# Patient Record
Sex: FEMALE
[De-identification: names, ages, dates, MRNs, and addresses within clinical notes are randomized per-mention and may not be internally consistent; named-entity substitution may affect disease eponyms.]

---

## 2019-01-22 ENCOUNTER — HOSPITAL ENCOUNTER (OUTPATIENT)
Dept: HOSPITAL 90 - LDH | Age: 44
Setting detail: OBSERVATION
Discharge: HOME | End: 2019-01-22
Attending: OBSTETRICS & GYNECOLOGY | Admitting: OBSTETRICS & GYNECOLOGY
Payer: COMMERCIAL

## 2019-01-22 DIAGNOSIS — Z3A.34: ICD-10-CM

## 2019-01-22 DIAGNOSIS — O13.3: Primary | ICD-10-CM

## 2019-01-22 DIAGNOSIS — O09.523: ICD-10-CM

## 2019-01-22 LAB
ALBUMIN SERPL-MCNC: 2.1 G/DL (ref 3.5–5)
ALT SERPL-CCNC: 36 U/L (ref 12–78)
APPEARANCE UR: CLEAR
APTT PPP: 25.2 SEC (ref 26.3–35.5)
AST SERPL-CCNC: 26 U/L (ref 10–37)
BASOPHILS NFR BLD AUTO: 0.3 % (ref 0–5)
BILIRUB SERPL-MCNC: 0.2 MG/DL (ref 0.2–1)
BUN SERPL-MCNC: 11 MG/DL (ref 7–18)
CHLORIDE SERPL-SCNC: 107 MMOL/L (ref 101–111)
CO2 SERPL-SCNC: 25 MMOL/L (ref 21–32)
CREAT SERPL-MCNC: 0.7 MG/DL (ref 0.5–1.5)
EOSINOPHIL NFR BLD AUTO: 0.7 % (ref 0–8)
ERYTHROCYTE [DISTWIDTH] IN BLOOD BY AUTOMATED COUNT: 15.6 % (ref 11–15.5)
FIBRINOGEN PPP-MCNC: 382 MG/DL (ref 180–350)
GFR SERPL CREATININE-BSD FRML MDRD: 98 ML/MIN (ref 60–?)
GLUCOSE SERPL-MCNC: 92 MG/DL (ref 70–105)
HCT VFR BLD AUTO: 31.9 % (ref 36–48)
INR PPP: 0.89 (ref 0.85–1.15)
LYMPHOCYTES NFR SPEC AUTO: 16 % (ref 21–51)
MCH RBC QN AUTO: 27.2 PG (ref 27–33)
MCHC RBC AUTO-ENTMCNC: 32 G/DL (ref 32–36)
MCV RBC AUTO: 85.1 FL (ref 79–99)
MONOCYTES NFR BLD AUTO: 8.7 % (ref 3–13)
NEUTROPHILS NFR BLD AUTO: 74.3 % (ref 40–77)
NRBC BLD MANUAL-RTO: 0.1 % (ref 0–0.19)
PH UR STRIP: 8 [PH] (ref 5–8)
PLATELET # BLD AUTO: 114 K/UL (ref 130–400)
POTASSIUM SERPL-SCNC: 3.8 MMOL/L (ref 3.5–5.1)
PROT SERPL-MCNC: 5.8 G/DL (ref 6–8.3)
PROTHROMBIN TIME: 9.4 SEC (ref 9.6–11.6)
RBC # BLD AUTO: 3.75 MIL/UL (ref 4–5.5)
SODIUM SERPL-SCNC: 139 MMOL/L (ref 136–145)
SP GR UR STRIP: 1.01 (ref 1–1.03)
URATE SERPL-MCNC: 4.4 MG/DL (ref 2.6–7.2)
UROBILINOGEN UR STRIP-MCNC: 0.2 MG/DL (ref 0.2–1)
WBC # BLD AUTO: 7.8 K/UL (ref 4.8–10.8)

## 2019-01-22 PROCEDURE — 80053 COMPREHEN METABOLIC PANEL: CPT

## 2019-01-22 PROCEDURE — 85610 PROTHROMBIN TIME: CPT

## 2019-01-22 PROCEDURE — 86900 BLOOD TYPING SEROLOGIC ABO: CPT

## 2019-01-22 PROCEDURE — 85384 FIBRINOGEN ACTIVITY: CPT

## 2019-01-22 PROCEDURE — 96360 HYDRATION IV INFUSION INIT: CPT

## 2019-01-22 PROCEDURE — 36415 COLL VENOUS BLD VENIPUNCTURE: CPT

## 2019-01-22 PROCEDURE — 87340 HEPATITIS B SURFACE AG IA: CPT

## 2019-01-22 PROCEDURE — 85025 COMPLETE CBC W/AUTO DIFF WBC: CPT

## 2019-01-22 PROCEDURE — 86850 RBC ANTIBODY SCREEN: CPT

## 2019-01-22 PROCEDURE — 86592 SYPHILIS TEST NON-TREP QUAL: CPT

## 2019-01-22 PROCEDURE — 84550 ASSAY OF BLOOD/URIC ACID: CPT

## 2019-01-22 PROCEDURE — 85730 THROMBOPLASTIN TIME PARTIAL: CPT

## 2019-01-22 PROCEDURE — 86901 BLOOD TYPING SEROLOGIC RH(D): CPT

## 2019-01-22 PROCEDURE — 81003 URINALYSIS AUTO W/O SCOPE: CPT

## 2019-02-07 ENCOUNTER — HOSPITAL ENCOUNTER (INPATIENT)
Dept: HOSPITAL 90 - LDH | Age: 44
LOS: 3 days | Discharge: HOME | End: 2019-02-10
Attending: OBSTETRICS & GYNECOLOGY | Admitting: OBSTETRICS & GYNECOLOGY
Payer: COMMERCIAL

## 2019-02-07 VITALS — HEIGHT: 62 IN | BODY MASS INDEX: 42.88 KG/M2 | WEIGHT: 233 LBS

## 2019-02-07 DIAGNOSIS — O26.893: ICD-10-CM

## 2019-02-07 DIAGNOSIS — Z3A.37: ICD-10-CM

## 2019-02-07 DIAGNOSIS — Z67.41: ICD-10-CM

## 2019-02-07 DIAGNOSIS — O34.211: Primary | ICD-10-CM

## 2019-02-07 DIAGNOSIS — Z30.2: ICD-10-CM

## 2019-02-07 LAB
ERYTHROCYTE [DISTWIDTH] IN BLOOD BY AUTOMATED COUNT: 15.8 % (ref 11–15.5)
HCT VFR BLD AUTO: 33.5 % (ref 36–48)
MCH RBC QN AUTO: 28 PG (ref 27–33)
MCHC RBC AUTO-ENTMCNC: 33.3 G/DL (ref 32–36)
MCV RBC AUTO: 83.9 FL (ref 79–99)
NRBC BLD MANUAL-RTO: 0 % (ref 0–0.19)
PLATELET # BLD AUTO: 99 K/UL (ref 130–400)
RBC # BLD AUTO: 4 MIL/UL (ref 4–5.5)
WBC # BLD AUTO: 5.6 K/UL (ref 4.8–10.8)

## 2019-02-07 PROCEDURE — 86850 RBC ANTIBODY SCREEN: CPT

## 2019-02-07 PROCEDURE — 36415 COLL VENOUS BLD VENIPUNCTURE: CPT

## 2019-02-07 PROCEDURE — 85027 COMPLETE CBC AUTOMATED: CPT

## 2019-02-07 PROCEDURE — 86592 SYPHILIS TEST NON-TREP QUAL: CPT

## 2019-02-07 PROCEDURE — 86900 BLOOD TYPING SEROLOGIC ABO: CPT

## 2019-02-07 PROCEDURE — 59510 CESAREAN DELIVERY: CPT

## 2019-02-07 PROCEDURE — 86901 BLOOD TYPING SEROLOGIC RH(D): CPT

## 2019-02-07 PROCEDURE — 88302 TISSUE EXAM BY PATHOLOGIST: CPT

## 2019-02-07 PROCEDURE — 87340 HEPATITIS B SURFACE AG IA: CPT

## 2019-02-08 VITALS — SYSTOLIC BLOOD PRESSURE: 126 MMHG | DIASTOLIC BLOOD PRESSURE: 56 MMHG

## 2019-02-08 VITALS — DIASTOLIC BLOOD PRESSURE: 81 MMHG | SYSTOLIC BLOOD PRESSURE: 136 MMHG

## 2019-02-08 VITALS — SYSTOLIC BLOOD PRESSURE: 149 MMHG | DIASTOLIC BLOOD PRESSURE: 70 MMHG

## 2019-02-08 VITALS — SYSTOLIC BLOOD PRESSURE: 138 MMHG | DIASTOLIC BLOOD PRESSURE: 72 MMHG

## 2019-02-08 PROCEDURE — 3E0234Z INTRODUCTION OF SERUM, TOXOID AND VACCINE INTO MUSCLE, PERCUTANEOUS APPROACH: ICD-10-PCS | Performed by: OBSTETRICS & GYNECOLOGY

## 2019-02-08 PROCEDURE — 0UB70ZZ EXCISION OF BILATERAL FALLOPIAN TUBES, OPEN APPROACH: ICD-10-PCS | Performed by: OBSTETRICS & GYNECOLOGY

## 2019-02-08 RX ADMIN — SODIUM CHLORIDE PRN MG: 9 INJECTION, SOLUTION INTRAVENOUS at 12:51

## 2019-02-08 RX ADMIN — IBUPROFEN SCH MLS/HR: 800 INJECTION INTRAVENOUS at 18:33

## 2019-02-08 RX ADMIN — SODIUM CHLORIDE PRN MG: 9 INJECTION, SOLUTION INTRAVENOUS at 23:26

## 2019-02-08 NOTE — NUR
ACTIVITY

MARIA DEL CARMEN CARE GIVEN, ENCOURAGED TO TURN TO SIDES OFTEN, TOLERATED WELL, TO SIDE OF BED, DANGLED, 
BECAME DIZZY, BUT STILL WANTED TO DANGLE, , TOLERATED WELL, SPOUSE AT BEDSIDE

-------------------------------------------------------------------------------

Addendum: 02/09/19 at 0213 by MELINA SOLANO LVN

-------------------------------------------------------------------------------

Amended: Links added.

## 2019-02-09 VITALS — DIASTOLIC BLOOD PRESSURE: 80 MMHG | SYSTOLIC BLOOD PRESSURE: 132 MMHG

## 2019-02-09 VITALS — DIASTOLIC BLOOD PRESSURE: 84 MMHG | SYSTOLIC BLOOD PRESSURE: 134 MMHG

## 2019-02-09 VITALS — SYSTOLIC BLOOD PRESSURE: 145 MMHG | DIASTOLIC BLOOD PRESSURE: 90 MMHG

## 2019-02-09 VITALS — SYSTOLIC BLOOD PRESSURE: 102 MMHG | DIASTOLIC BLOOD PRESSURE: 80 MMHG

## 2019-02-09 VITALS — DIASTOLIC BLOOD PRESSURE: 53 MMHG | SYSTOLIC BLOOD PRESSURE: 123 MMHG

## 2019-02-09 VITALS — SYSTOLIC BLOOD PRESSURE: 135 MMHG | DIASTOLIC BLOOD PRESSURE: 81 MMHG

## 2019-02-09 LAB
ERYTHROCYTE [DISTWIDTH] IN BLOOD BY AUTOMATED COUNT: 16.1 % (ref 11–15.5)
HCT VFR BLD AUTO: 31.1 % (ref 36–48)
MCH RBC QN AUTO: 28.2 PG (ref 27–33)
MCHC RBC AUTO-ENTMCNC: 33.8 G/DL (ref 32–36)
MCV RBC AUTO: 83.4 FL (ref 79–99)
NRBC BLD MANUAL-RTO: 0 % (ref 0–0.19)
PLATELET # BLD AUTO: 98 K/UL (ref 130–400)
RBC # BLD AUTO: 3.72 MIL/UL (ref 4–5.5)
WBC # BLD AUTO: 6.2 K/UL (ref 4.8–10.8)

## 2019-02-09 RX ADMIN — IBUPROFEN SCH MLS/HR: 800 INJECTION INTRAVENOUS at 02:26

## 2019-02-09 RX ADMIN — ACETAMINOPHEN AND CODEINE PHOSPHATE PRN TAB: 300; 30 TABLET ORAL at 22:18

## 2019-02-09 RX ADMIN — ACETAMINOPHEN AND CODEINE PHOSPHATE PRN TAB: 300; 30 TABLET ORAL at 14:22

## 2019-02-09 RX ADMIN — Medication PRN MG: at 14:22

## 2019-02-09 RX ADMIN — Medication PRN MG: at 19:29

## 2019-02-09 RX ADMIN — Medication PRN MG: at 20:33

## 2019-02-09 RX ADMIN — BISACODYL PRN MG: 10 SUPPOSITORY RECTAL at 17:33

## 2019-02-09 RX ADMIN — IBUPROFEN SCH MG: 800 TABLET ORAL at 11:31

## 2019-02-09 RX ADMIN — BISACODYL PRN MG: 10 SUPPOSITORY RECTAL at 22:17

## 2019-02-09 RX ADMIN — DEXTROSE AND SODIUM CHLORIDE PRN MLS/HR: 5; .45 INJECTION, SOLUTION INTRAVENOUS at 09:55

## 2019-02-09 RX ADMIN — DEXTROSE AND SODIUM CHLORIDE PRN MLS/HR: 5; .45 INJECTION, SOLUTION INTRAVENOUS at 00:35

## 2019-02-09 RX ADMIN — Medication PRN MG: at 09:53

## 2019-02-09 RX ADMIN — IBUPROFEN SCH MG: 800 TABLET ORAL at 19:30

## 2019-02-09 RX ADMIN — DOCUSATE SODIUM SCH MG: 100 TABLET, FILM COATED ORAL at 20:33

## 2019-02-09 NOTE — NUR
FRANCIS CATHETER



F/C REMOVED , INTACT, TOLERATED WELL. UP TO CHAIR , NO C/O WEAKNESS OR DIZZINESS, CALL LIGHT 
AT SIDE

-------------------------------------------------------------------------------

Addendum: 02/09/19 at 0633 by MELINA SOLANO LVN

-------------------------------------------------------------------------------

Amended: Links added.

## 2019-02-09 NOTE — NUR
c/o still feeling discomfort and pain and was medicated with scheduled Motrin 800mgs and was 
given Mylicon.  Offered stronger medication but patient refused and indicated wanting to 
wait until later.

## 2019-02-09 NOTE — NUR
PAIN

C/O PAIN TO ABD, VERBALIZED DULCOLAX SUPP GIVEN EARLIER WAS EXPELLED TOO SOON AND WAS NOT 
EFFECTIVE, DULCOLAX SUPPOSITORY ADMINISTERED AND TYLENOL  # 3 - 2 TABS GIVEN PO

## 2019-02-09 NOTE — NUR
RHOGAM

10mL OF NS GIVEN IV, RHOGAM 2mL GIVEN, 10mL OF NS FLUSHED PROVIDED, PT TOLERATED WELL, NO 
C/O PAIN NOR TRANSFUSION REACTION NOTED, IV STILL PATENT AND INFUSING D5 1/2NS, THEN 
DISCONTINUED IV AFTER 10min; POC DISCUSSED, PT VERBALIZED UNDERSTANDING

## 2019-02-09 NOTE — NUR
Patient was moved to room 111 and ambulated in hallway at this time.  States having voided 
after dulcolax suppository but had no bm or passed gas.  Encouraged patient again to walk in 
hallway and reinforced importance of walking after meals and before bedtime.

## 2019-02-09 NOTE — NUR
Report received from JOSE R Toney RN and patient care transfered at this time.  Patient c/o 
feeling bloated and being uncomfortable and was offered and accepted to have a dulcolax 
suppository administered for relief of gas discomfort.  Suppository was administered and 
encouraged to walk.

## 2019-02-10 VITALS — DIASTOLIC BLOOD PRESSURE: 80 MMHG | SYSTOLIC BLOOD PRESSURE: 123 MMHG

## 2019-02-10 VITALS — DIASTOLIC BLOOD PRESSURE: 74 MMHG | SYSTOLIC BLOOD PRESSURE: 142 MMHG

## 2019-02-10 VITALS — SYSTOLIC BLOOD PRESSURE: 152 MMHG | DIASTOLIC BLOOD PRESSURE: 80 MMHG

## 2019-02-10 VITALS — SYSTOLIC BLOOD PRESSURE: 140 MMHG | DIASTOLIC BLOOD PRESSURE: 73 MMHG

## 2019-02-10 VITALS — DIASTOLIC BLOOD PRESSURE: 67 MMHG | SYSTOLIC BLOOD PRESSURE: 127 MMHG

## 2019-02-10 RX ADMIN — ACETAMINOPHEN AND CODEINE PHOSPHATE PRN TAB: 300; 30 TABLET ORAL at 06:54

## 2019-02-10 RX ADMIN — IBUPROFEN SCH MG: 800 TABLET ORAL at 11:30

## 2019-02-10 RX ADMIN — IBUPROFEN SCH MG: 800 TABLET ORAL at 03:21

## 2019-02-10 RX ADMIN — DOCUSATE SODIUM SCH MG: 100 TABLET, FILM COATED ORAL at 09:23

## 2019-02-10 NOTE — NUR
pt requesting pain medication. pain 7/10. tylenol 3 ii tab po given with apple and cranberry 
juice.

## 2019-02-10 NOTE — NUR
DISCHARGE INSTRUCTIONS

DISCHARGE INSTRUCTIONS REVIEWED WITH PT, ENCOURAGED PT TO ASK QUESTIONS. DISCHARGE FORMS 
SIGNED AND EXIT CARE INSTRUCTIONS GIVEN TO PT ALONG WITH PRESCRIPTION FOR TYLENOL 
#3/IBUPROFEN 800MG/ AND COLACE.

## 2019-02-10 NOTE — NUR
DISCHARGE

DISCHARGED VIA WHEELCHAIR PER DENISE GUILLEN, EUSEBIA WITH BABY IN ARMS. SPOUSE ALONGSIDE. 
DEPARTED IN STABLE CONDITION WITH INFANT STRAPPED SECURELY IN INFANT CARSEAT IN PERSONAL 
VEHICLE.

## 2021-07-19 ENCOUNTER — HOSPITAL ENCOUNTER (EMERGENCY)
Dept: HOSPITAL 90 - EDH | Age: 46
Discharge: HOME | End: 2021-07-19
Payer: COMMERCIAL

## 2021-07-19 VITALS — WEIGHT: 205.03 LBS | BODY MASS INDEX: 37.73 KG/M2 | HEIGHT: 62 IN

## 2021-07-19 VITALS — DIASTOLIC BLOOD PRESSURE: 80 MMHG | SYSTOLIC BLOOD PRESSURE: 142 MMHG

## 2021-07-19 VITALS — SYSTOLIC BLOOD PRESSURE: 115 MMHG | DIASTOLIC BLOOD PRESSURE: 59 MMHG

## 2021-07-19 DIAGNOSIS — F41.1: Primary | ICD-10-CM

## 2021-07-19 DIAGNOSIS — E86.9: ICD-10-CM

## 2021-07-19 DIAGNOSIS — I10: ICD-10-CM

## 2021-07-19 DIAGNOSIS — R20.0: ICD-10-CM

## 2021-07-19 LAB
ALBUMIN SERPL-MCNC: 3.7 G/DL (ref 3.5–5)
ALT SERPL-CCNC: 28 U/L (ref 12–78)
APPEARANCE UR: (no result)
AST SERPL-CCNC: 15 U/L (ref 10–37)
BASOPHILS NFR BLD AUTO: 0.7 % (ref 0–5)
BILIRUB SERPL-MCNC: 0.3 MG/DL (ref 0.2–1)
BUN SERPL-MCNC: 12 MG/DL (ref 7–18)
CHLORIDE SERPL-SCNC: 104 MMOL/L (ref 101–111)
CO2 SERPL-SCNC: 28 MMOL/L (ref 21–32)
CREAT SERPL-MCNC: 0.7 MG/DL (ref 0.5–1.5)
DEPRECATED SQUAMOUS URNS QL MICRO: (no result) /HPF (ref 0–2)
EOSINOPHIL NFR BLD AUTO: 1.3 % (ref 0–8)
ERYTHROCYTE [DISTWIDTH] IN BLOOD BY AUTOMATED COUNT: 13.2 % (ref 11–15.5)
GFR SERPL CREATININE-BSD FRML MDRD: 97 ML/MIN (ref 60–?)
GLUCOSE SERPL-MCNC: 112 MG/DL (ref 70–105)
HCG UR QL: NEGATIVE
HCT VFR BLD AUTO: 41.8 % (ref 36–48)
LYMPHOCYTES NFR SPEC AUTO: 22.8 % (ref 21–51)
MCH RBC QN AUTO: 29.4 PG (ref 27–33)
MCHC RBC AUTO-ENTMCNC: 32.5 G/DL (ref 32–36)
MCV RBC AUTO: 90.3 FL (ref 79–99)
MONOCYTES NFR BLD AUTO: 5.4 % (ref 3–13)
NEUTROPHILS NFR BLD AUTO: 69.7 % (ref 40–77)
NRBC BLD MANUAL-RTO: 0 % (ref 0–0.19)
PH UR STRIP: 7 [PH] (ref 5–8)
PLATELET # BLD AUTO: 167 K/UL (ref 130–400)
POTASSIUM SERPL-SCNC: 4.1 MMOL/L (ref 3.5–5.1)
PROT SERPL-MCNC: 7.8 G/DL (ref 6–8.3)
RBC # BLD AUTO: 4.63 MIL/UL (ref 4–5.5)
SODIUM SERPL-SCNC: 140 MMOL/L (ref 136–145)
SP GR UR STRIP: 1.02 (ref 1–1.03)
TSH SERPL DL<=0.05 MIU/L-ACNC: 2.08 UIU/ML (ref 0.36–3.74)
UROBILINOGEN UR STRIP-MCNC: 1 MG/DL (ref 0.2–1)
WBC # BLD AUTO: 7.2 K/UL (ref 4.8–10.8)
WBC #/AREA URNS HPF: (no result) /HPF (ref 0–1)

## 2021-07-19 PROCEDURE — 93005 ELECTROCARDIOGRAM TRACING: CPT

## 2021-07-19 PROCEDURE — 80053 COMPREHEN METABOLIC PANEL: CPT

## 2021-07-19 PROCEDURE — 81001 URINALYSIS AUTO W/SCOPE: CPT

## 2021-07-19 PROCEDURE — 71045 X-RAY EXAM CHEST 1 VIEW: CPT

## 2021-07-19 PROCEDURE — 87088 URINE BACTERIA CULTURE: CPT

## 2021-07-19 PROCEDURE — 84443 ASSAY THYROID STIM HORMONE: CPT

## 2021-07-19 PROCEDURE — 81025 URINE PREGNANCY TEST: CPT

## 2021-07-19 PROCEDURE — 36415 COLL VENOUS BLD VENIPUNCTURE: CPT

## 2021-07-19 PROCEDURE — 85025 COMPLETE CBC W/AUTO DIFF WBC: CPT
